# Patient Record
Sex: MALE | Race: WHITE | NOT HISPANIC OR LATINO
[De-identification: names, ages, dates, MRNs, and addresses within clinical notes are randomized per-mention and may not be internally consistent; named-entity substitution may affect disease eponyms.]

---

## 2021-09-20 PROBLEM — Z00.00 ENCOUNTER FOR PREVENTIVE HEALTH EXAMINATION: Status: ACTIVE | Noted: 2021-09-20

## 2021-09-22 PROBLEM — M79.641 PAIN OF RIGHT HAND: Status: ACTIVE | Noted: 2021-09-22

## 2021-09-25 ENCOUNTER — NON-APPOINTMENT (OUTPATIENT)
Age: 61
End: 2021-09-25

## 2021-09-25 ENCOUNTER — APPOINTMENT (OUTPATIENT)
Dept: ORTHOPEDIC SURGERY | Facility: CLINIC | Age: 61
End: 2021-09-25
Payer: COMMERCIAL

## 2021-09-25 VITALS
SYSTOLIC BLOOD PRESSURE: 153 MMHG | BODY MASS INDEX: 28.71 KG/M2 | HEART RATE: 67 BPM | HEIGHT: 72 IN | DIASTOLIC BLOOD PRESSURE: 90 MMHG | WEIGHT: 212 LBS

## 2021-09-25 DIAGNOSIS — Z82.49 FAMILY HISTORY OF ISCHEMIC HEART DISEASE AND OTHER DISEASES OF THE CIRCULATORY SYSTEM: ICD-10-CM

## 2021-09-25 DIAGNOSIS — M65.331 TRIGGER FINGER, RIGHT MIDDLE FINGER: ICD-10-CM

## 2021-09-25 DIAGNOSIS — Z86.79 PERSONAL HISTORY OF OTHER DISEASES OF THE CIRCULATORY SYSTEM: ICD-10-CM

## 2021-09-25 DIAGNOSIS — M25.60 STIFFNESS OF UNSPECIFIED JOINT, NOT ELSEWHERE CLASSIFIED: ICD-10-CM

## 2021-09-25 DIAGNOSIS — Z83.3 FAMILY HISTORY OF DIABETES MELLITUS: ICD-10-CM

## 2021-09-25 DIAGNOSIS — Z87.891 PERSONAL HISTORY OF NICOTINE DEPENDENCE: ICD-10-CM

## 2021-09-25 DIAGNOSIS — Z78.9 OTHER SPECIFIED HEALTH STATUS: ICD-10-CM

## 2021-09-25 DIAGNOSIS — Z86.69 PERSONAL HISTORY OF OTHER DISEASES OF THE NERVOUS SYSTEM AND SENSE ORGANS: ICD-10-CM

## 2021-09-25 DIAGNOSIS — M25.50 PAIN IN UNSPECIFIED JOINT: ICD-10-CM

## 2021-09-25 DIAGNOSIS — M79.641 PAIN IN RIGHT HAND: ICD-10-CM

## 2021-09-25 PROCEDURE — 99203 OFFICE O/P NEW LOW 30 MIN: CPT | Mod: 25

## 2021-09-25 PROCEDURE — 20600 DRAIN/INJ JOINT/BURSA W/O US: CPT | Mod: F7

## 2021-09-25 RX ORDER — LATANOPROST/PF 0.005 %
DROPS OPHTHALMIC (EYE)
Refills: 0 | Status: ACTIVE | COMMUNITY

## 2021-09-25 RX ORDER — DORZOLAMIDE HYDROCHLORIDE AND TIMOLOL MALEATE 20; 5 MG/ML; MG/ML
SOLUTION/ DROPS OPHTHALMIC
Refills: 0 | Status: ACTIVE | COMMUNITY

## 2021-09-25 RX ORDER — ATENOLOL 50 MG/1
TABLET ORAL
Refills: 0 | Status: ACTIVE | COMMUNITY

## 2021-09-25 RX ORDER — BRIMONIDINE TARTRATE 1.5 MG/ML
0.15 SOLUTION/ DROPS OPHTHALMIC
Refills: 0 | Status: ACTIVE | COMMUNITY

## 2021-09-25 RX ORDER — AMLODIPINE BESYLATE 5 MG/1
TABLET ORAL
Refills: 0 | Status: ACTIVE | COMMUNITY

## 2021-09-25 NOTE — PHYSICAL EXAM
[de-identified] : Right Hand/Fingers: No obvious deformities, atrophy, ecchymosis, or swelling/effusion. No signs of paronychia, felon, or Kanavel's four cardinal signs. Positive tenderness over the 3rd finger A1 pulley. Negative tenderness over the metacarpals, phalanges, MCP joints, PIP joints, DIP joints, and thenar/hypothenar eminences. Pain with flexion at the 3rd MCP joint over the A1 pulley. Neurovascular status is intact.\par \par Otherwise, no apparent distress. Alert and oriented x 3. Normal mood and affect. No atrophy or swelling. 5 out of 5 motor strength. Sensation is intact and symmetrical. Normal deep tendon reflexes. Full range of motion of shoulder, elbows, hips, and knees (flexion, extension, and rotation). No palpatory tenderness or palpable lymph nodes. Negative straight leg raise. Normal finger-to-nose test. No pathological reflexes. Normal ambulation. No upper or lower extremity instability. [de-identified] : Patient declined x-rays at this time.

## 2021-09-25 NOTE — DISCUSSION/SUMMARY
[de-identified] : Cortisone injection administered\par Educated on continued use of ice and Advil/Aleve for the pain\par Advised on limiting aggravating activities\par Discussed following up with either Dr. Cervantes or Dr. Spencer if pain/locking persists in 4-6 weeks\par All options discussed with patient and his wife\par All questions by them were answered to their satisfaction\par They expressed full understanding and agreement with plan\par They are both very happy with the office visit

## 2021-09-25 NOTE — PROCEDURE
[de-identified] : A discussion was had with the patient regarding this procedure and all questions were answered. All risks, benefits, and alternatives were discussed. These included, but were not limited to, bleeding, infection, and allergic reaction. Alcohol was used to clean the skin, and Betadine was used to sterilize and prep the right 3rd A1 pulley. Ethyl chloride spray was then used as a topical anesthetic. A 25-gauged needle was used to inject 1 cc's of 1% Lidocaine and 1 cc's of 40mg/ml of Depo-Medrol into the right 3rd A1 pulley. A sterile bandage was then applied. Patient tolerated the procedure well and there were no complications.\par

## 2021-09-25 NOTE — HISTORY OF PRESENT ILLNESS
[de-identified] : Mr. BERTA ELY is a 61 year male who is RHD and presents to office complaining of right hand pain x 2 months with insidious onset.\par He says he works as a  in an office and does a lot of housework/yard work.\par Pain is felt at the base of the right middle finger on the palmar aspect. Pain is an intermittent sharp pain.\par He says pain is worse in the morning when he wakes up and after a long day of increased activity with this hand.\par He noted that at times, his finger gets very stiff and feels like it "locks".\par Patient has tried rest and ice for the pain, which helps somewhat.\par Denies numbness/tingling or any other associated symptoms with this pain.\par All review of systems, family history, social history, surgical history, past medical history, medications, and allergies not previously stated as positive are negative. They were reviewed by me today with the patient and documented accordingly.

## 2022-10-11 ENCOUNTER — APPOINTMENT (OUTPATIENT)
Dept: ORTHOPEDIC SURGERY | Facility: CLINIC | Age: 62
End: 2022-10-11

## 2022-10-11 VITALS — BODY MASS INDEX: 28.71 KG/M2 | WEIGHT: 212 LBS | HEIGHT: 72 IN

## 2022-10-11 DIAGNOSIS — S93.401A SPRAIN OF UNSPECIFIED LIGAMENT OF RIGHT ANKLE, INITIAL ENCOUNTER: ICD-10-CM

## 2022-10-11 PROCEDURE — L4361: CPT | Mod: RT

## 2022-10-11 PROCEDURE — 99203 OFFICE O/P NEW LOW 30 MIN: CPT

## 2022-10-11 PROCEDURE — 73610 X-RAY EXAM OF ANKLE: CPT | Mod: RT

## 2022-10-11 NOTE — HISTORY OF PRESENT ILLNESS
[8] : 8 [0] : 0 [Dull/Aching] : dull/aching [Localized] : localized [Sharp] : sharp [Intermittent] : intermittent [Standing] : standing [Walking] : walking [Stairs] : stairs [Full time] : Work status: full time [de-identified] : 62 YM with right ankle pain for 2 days.  He states that he was taking flags in his town and felt a pop on the side of his ankle.  He is able to bear weight, but with pain. [] : Post Surgical Visit: no [FreeTextEntry1] : Rt ankle [FreeTextEntry3] : 10/9/22 [FreeTextEntry5] : Patient was doing a cleanup taking flags down and complains of ankle pain since 10/9/22

## 2022-10-11 NOTE — PHYSICAL EXAM
[Moderate] : moderate swelling of lateral ankle [5___] : [facility] 5[unfilled]/5 [2+] : posterior tibialis pulse: 2+ [Normal] : saphenous nerve sensation normal [Right] : right ankle [There are no fractures, subluxations or dislocations. No significant abnormalities are seen] : There are no fractures, subluxations or dislocations. No significant abnormalities are seen [] : non-antalgic [FreeTextEntry9] : questionable old avulsion injury lateral malleolus [de-identified] : plantar flexion 20 degrees [de-identified] : inversion 10 degrees [de-identified] : eversion 10 degrees [TWNoteComboBox7] : dorsiflexion 5 degrees

## 2022-10-11 NOTE — ASSESSMENT
[FreeTextEntry1] : He is given a cam boot, medically necessary for protected weight bearing.  WBAT in the boot.  f/u in 1 week with Dr Zheng.